# Patient Record
Sex: FEMALE | Race: ASIAN | Employment: OTHER | URBAN - METROPOLITAN AREA
[De-identification: names, ages, dates, MRNs, and addresses within clinical notes are randomized per-mention and may not be internally consistent; named-entity substitution may affect disease eponyms.]

---

## 2017-08-02 ENCOUNTER — APPOINTMENT (OUTPATIENT)
Dept: GENERAL RADIOLOGY | Facility: HOSPITAL | Age: 72
End: 2017-08-02
Attending: EMERGENCY MEDICINE
Payer: MEDICAID

## 2017-08-02 ENCOUNTER — HOSPITAL ENCOUNTER (EMERGENCY)
Facility: HOSPITAL | Age: 72
Discharge: ANOTHER HOSPITAL | End: 2017-08-03
Attending: EMERGENCY MEDICINE
Payer: MEDICAID

## 2017-08-02 DIAGNOSIS — R16.0 LIVER MASS: ICD-10-CM

## 2017-08-02 DIAGNOSIS — R91.8 LUNG MASS: Primary | ICD-10-CM

## 2017-08-02 DIAGNOSIS — E87.1 HYPONATREMIA: ICD-10-CM

## 2017-08-02 LAB
ANION GAP SERPL CALC-SCNC: 11 MMOL/L (ref 0–18)
BASOPHILS # BLD: 0.1 K/UL (ref 0–0.2)
BASOPHILS NFR BLD: 1 %
BILIRUB UR QL: NEGATIVE
BUN SERPL-MCNC: 10 MG/DL (ref 8–20)
BUN/CREAT SERPL: 11.9 (ref 10–20)
CALCIUM SERPL-MCNC: 10.8 MG/DL (ref 8.5–10.5)
CHLORIDE SERPL-SCNC: 92 MMOL/L (ref 95–110)
CLARITY UR: CLEAR
CO2 SERPL-SCNC: 21 MMOL/L (ref 22–32)
COLOR UR: YELLOW
CREAT SERPL-MCNC: 0.84 MG/DL (ref 0.5–1.5)
EOSINOPHIL # BLD: 0.1 K/UL (ref 0–0.7)
EOSINOPHIL NFR BLD: 1 %
ERYTHROCYTE [DISTWIDTH] IN BLOOD BY AUTOMATED COUNT: 15.5 % (ref 11–15)
GLUCOSE BLDC GLUCOMTR-MCNC: 264 MG/DL (ref 70–99)
GLUCOSE SERPL-MCNC: 270 MG/DL (ref 70–99)
HCT VFR BLD AUTO: 31.7 % (ref 35–48)
HGB BLD-MCNC: 10.2 G/DL (ref 12–16)
HGB UR QL STRIP.AUTO: NEGATIVE
KETONES UR-MCNC: NEGATIVE MG/DL
LEUKOCYTE ESTERASE UR QL STRIP.AUTO: NEGATIVE
LYMPHOCYTES # BLD: 1.4 K/UL (ref 1–4)
LYMPHOCYTES NFR BLD: 11 %
MCH RBC QN AUTO: 25 PG (ref 27–32)
MCHC RBC AUTO-ENTMCNC: 32.4 G/DL (ref 32–37)
MCV RBC AUTO: 77.1 FL (ref 80–100)
MONOCYTES # BLD: 0.8 K/UL (ref 0–1)
MONOCYTES NFR BLD: 7 %
NEUTROPHILS # BLD AUTO: 9.6 K/UL (ref 1.8–7.7)
NEUTROPHILS NFR BLD: 80 %
NITRITE UR QL STRIP.AUTO: NEGATIVE
OSMOLALITY UR CALC.SUM OF ELEC: 267 MOSM/KG (ref 275–295)
PH UR: 6 [PH] (ref 5–8)
PLATELET # BLD AUTO: 288 K/UL (ref 140–400)
PMV BLD AUTO: 8.8 FL (ref 7.4–10.3)
POTASSIUM SERPL-SCNC: 4.9 MMOL/L (ref 3.3–5.1)
PROT UR-MCNC: NEGATIVE MG/DL
RBC # BLD AUTO: 4.11 M/UL (ref 3.7–5.4)
SODIUM SERPL-SCNC: 124 MMOL/L (ref 136–144)
SP GR UR STRIP: 1 (ref 1–1.03)
UROBILINOGEN UR STRIP-ACNC: <2
VIT C UR-MCNC: NEGATIVE MG/DL
WBC # BLD AUTO: 12 K/UL (ref 4–11)

## 2017-08-02 PROCEDURE — 96361 HYDRATE IV INFUSION ADD-ON: CPT

## 2017-08-02 PROCEDURE — 81003 URINALYSIS AUTO W/O SCOPE: CPT | Performed by: EMERGENCY MEDICINE

## 2017-08-02 PROCEDURE — 85025 COMPLETE CBC W/AUTO DIFF WBC: CPT | Performed by: EMERGENCY MEDICINE

## 2017-08-02 PROCEDURE — 71010 XR CHEST AP PORTABLE  (CPT=71010): CPT | Performed by: EMERGENCY MEDICINE

## 2017-08-02 PROCEDURE — 93010 ELECTROCARDIOGRAM REPORT: CPT | Performed by: EMERGENCY MEDICINE

## 2017-08-02 PROCEDURE — 99285 EMERGENCY DEPT VISIT HI MDM: CPT

## 2017-08-02 PROCEDURE — 80048 BASIC METABOLIC PNL TOTAL CA: CPT | Performed by: EMERGENCY MEDICINE

## 2017-08-02 PROCEDURE — 93005 ELECTROCARDIOGRAM TRACING: CPT

## 2017-08-02 PROCEDURE — 96360 HYDRATION IV INFUSION INIT: CPT

## 2017-08-02 PROCEDURE — 84484 ASSAY OF TROPONIN QUANT: CPT | Performed by: EMERGENCY MEDICINE

## 2017-08-02 PROCEDURE — 80076 HEPATIC FUNCTION PANEL: CPT | Performed by: EMERGENCY MEDICINE

## 2017-08-02 PROCEDURE — 82962 GLUCOSE BLOOD TEST: CPT

## 2017-08-02 PROCEDURE — 83690 ASSAY OF LIPASE: CPT | Performed by: EMERGENCY MEDICINE

## 2017-08-03 ENCOUNTER — APPOINTMENT (OUTPATIENT)
Dept: CT IMAGING | Facility: HOSPITAL | Age: 72
End: 2017-08-03
Attending: EMERGENCY MEDICINE
Payer: MEDICAID

## 2017-08-03 VITALS
SYSTOLIC BLOOD PRESSURE: 136 MMHG | HEIGHT: 65 IN | RESPIRATION RATE: 17 BRPM | WEIGHT: 158 LBS | BODY MASS INDEX: 26.33 KG/M2 | TEMPERATURE: 98 F | OXYGEN SATURATION: 95 % | DIASTOLIC BLOOD PRESSURE: 61 MMHG | HEART RATE: 90 BPM

## 2017-08-03 LAB
ALBUMIN SERPL BCP-MCNC: 3.1 G/DL (ref 3.5–4.8)
ALP SERPL-CCNC: 81 U/L (ref 32–100)
ALT SERPL-CCNC: 22 U/L (ref 14–54)
AST SERPL-CCNC: 25 U/L (ref 15–41)
BILIRUB DIRECT SERPL-MCNC: 0.1 MG/DL (ref 0–0.2)
BILIRUB SERPL-MCNC: 0.4 MG/DL (ref 0.3–1.2)
LIPASE SERPL-CCNC: 34 U/L (ref 22–51)
PROT SERPL-MCNC: 7.6 G/DL (ref 5.9–8.4)
TROPONIN I SERPL-MCNC: 0.01 NG/ML (ref ?–0.03)

## 2017-08-03 PROCEDURE — 71260 CT THORAX DX C+: CPT | Performed by: EMERGENCY MEDICINE

## 2017-08-03 RX ORDER — ATORVASTATIN CALCIUM 20 MG/1
20 TABLET, FILM COATED ORAL DAILY
COMMUNITY

## 2017-08-03 NOTE — ED NOTES
Patient's family does not want CT. Discussed with family that [facility] requires these results to accept transfer, family still refusing. MD notified.

## 2017-08-03 NOTE — ED NOTES
Pt presents to ED via [facility] [address] from home with c/o fatigue and decreased appetite for 2 months. Pt went to [place] and got back last week- states was there for 3 weeks and was only \"drinking fluids. \" Pt has not been eating a lot- son states the pt will eat if pal

## 2017-08-03 NOTE — ED INITIAL ASSESSMENT (HPI)
Pt c/o high blood sugar, decrease in appetite, fatigue. Denies abd pain, n/v, cp, sob, or fevers. Denies confusion. Per Son reports, \"It all started when she was fasting with Ramadhan\".

## 2017-08-03 NOTE — ED PROVIDER NOTES
Patient Seen in: [facility] AND [facility] Emergency Department    History   Patient presents with:  Fatigue (constitutional, neurologic)    Stated Complaint: Not eating anything and sleeping all the time    HPI    67year old female with pmh DM, HTN who is brou 13  Temp: 99 °F (37.2 °C)  Temp src: Oral  SpO2: 96 %  O2 Device: None (Room air)    Current:/61   Pulse 90   Temp 98.3 °F (36.8 °C) (Oral)   Resp 17   Ht 165.1 cm (5' 5\")   Wt 71.7 kg   SpO2 95%   BMI 26.29 kg/m²         Physical Exam   Constitutio Notable for the following:      Albumin 3.1 (*)     All other components within normal limits   POCT GLUCOSE - Abnormal; Notable for the following:     POC Glucose  264 (*)     All other components within normal limits   CBC W/ DIFFERENTIAL - Abnormal; Nota electronically signed and verified by the Radiologist whose name is printed above.     DD:  08/02/2017/DT:  08/03/2017    CT chest pulmonary angiogram      IMPRESSION:  -Numerous bilateral pulmonary nodules and masses, and left-sided pleural-based nodules m collecting and interpreting tests and discussion with consultants but not including time spent performing procedures.       Disposition and Plan     Clinical Impression:  Lung mass  (primary encounter diagnosis)  Liver mass  Hyponatremia    Disposition:  Tr

## 2018-04-30 ENCOUNTER — HOSPITAL (OUTPATIENT)
Dept: OTHER | Age: 73
End: 2018-04-30
Attending: HOSPITALIST

## 2018-04-30 LAB
ALBUMIN SERPL-MCNC: 1.5 GM/DL (ref 3.6–5.1)
ALBUMIN/GLOB SERPL: 0.2 {RATIO} (ref 1–2.4)
ALP SERPL-CCNC: 156 UNIT/L (ref 45–117)
ALT SERPL-CCNC: 27 UNIT/L
ANALYZER ANC (IANC): ABNORMAL
ANION GAP SERPL CALC-SCNC: 14 MMOL/L (ref 10–20)
APTT PPP: 36 SECONDS (ref 22–30)
APTT PPP: ABNORMAL S
AST SERPL-CCNC: 65 UNIT/L
BASE DEFICIT BLDA-SCNC: 0 MMOL/L (ref 0–2)
BASE EXCESS BLDA CALC-SCNC: ABNORMAL MMOL/L
BASOPHILS # BLD: 0 THOUSAND/MCL (ref 0–0.3)
BASOPHILS NFR BLD: 0 %
BDY SITE: ABNORMAL
BILIRUB SERPL-MCNC: 0.5 MG/DL (ref 0.2–1)
BODY TEMPERATURE: 37 DEGREES
BUN SERPL-MCNC: 14 MG/DL (ref 6–20)
BUN/CREAT SERPL: 17 (ref 7–25)
CA-I BLD ISE-SCNC: 1.21 MMOL/L (ref 1.15–1.29)
CA-I BLDA-SCNC: 13 % (ref 15–23)
CALCIUM SERPL-MCNC: 8.6 MG/DL (ref 8.4–10.2)
CHLORIDE BLD-SCNC: 97 MMOL/L (ref 98–107)
CHLORIDE: 95 MMOL/L (ref 98–107)
CO2 SERPL-SCNC: 26 MMOL/L (ref 21–32)
COHGB MFR BLD: 1.5 %
CONDITION: ABNORMAL
CONDITION: ABNORMAL
CREAT SERPL-MCNC: 0.83 MG/DL (ref 0.51–0.95)
D DIMER PPP FEU-MCNC: 4.26 MG/L FEU
DIFFERENTIAL METHOD BLD: ABNORMAL
EOSINOPHIL # BLD: 0.2 THOUSAND/MCL (ref 0.1–0.5)
EOSINOPHIL NFR BLD: 1 %
ERYTHROCYTE [DISTWIDTH] IN BLOOD: 19.3 % (ref 11–15)
GLOBULIN SER-MCNC: 6.1 GM/DL (ref 2–4)
GLUCOSE BLD-MCNC: 265 MG/DL (ref 65–99)
GLUCOSE SERPL-MCNC: 259 MG/DL (ref 65–99)
HCO3 BLDA-SCNC: 26 MMOL/L (ref 22–28)
HCT VFR BLD CALC: 28 % (ref 36–46.5)
HEMATOCRIT: 27.1 % (ref 36–46.5)
HGB BLD-MCNC: 8.7 GM/DL (ref 12–15.5)
HGB BLD-MCNC: 9.3 GM/DL (ref 12–15.5)
HOROWITZ INDEX BLD+IHG-RTO: 100 %
HYPOCHROMIA (HYPOC): ABNORMAL
INR PPP: 1.2
LACTATE BLDA-MCNC: 2.3 MMOL/L
LYMPHOCYTES # BLD: 6.4 THOUSAND/MCL (ref 1–4)
LYMPHOCYTES NFR BLD: 28 %
MCH RBC QN AUTO: 23.6 PG (ref 26–34)
MCHC RBC AUTO-ENTMCNC: 32.1 GM/DL (ref 32–36.5)
MCV RBC AUTO: 73.6 FL (ref 78–100)
METHGB MFR BLD: 1 %
MONOCYTES # BLD: 1.4 THOUSAND/MCL (ref 0.3–0.9)
MONOCYTES NFR BLD: 6 %
MYELOCYTES NFR BLD: 1 %
NEUTROPHILS # BLD: 14.6 THOUSAND/MCL (ref 1.8–7.7)
NEUTS SEG NFR BLD: 64 %
OXYHGB MFR BLD: 96 % (ref 94–98)
PATH REV BLD -IMP: ABNORMAL
PATHOLOGIST NAME: NORMAL
PCO2 BLDA: 52 MM HG (ref 32–45)
PERCENT NRBC: 0 %
PH BLDA: 7.31 UNIT (ref 7.35–7.45)
PLAT MORPH BLD: NORMAL
PLATELET # BLD: 770 THOUSAND/MCL (ref 140–450)
PO2 BLDA: 100 MM HG (ref 83–108)
POLYCHROMASIA (POLY): ABNORMAL
POTASSIUM BLD-SCNC: 3.7 MMOL/L (ref 3.4–5.1)
POTASSIUM SERPL-SCNC: 4 MMOL/L (ref 3.4–5.1)
PROT SERPL-MCNC: 7.6 GM/DL (ref 6.4–8.2)
PROTHROMBIN TIME: 11.6 SECONDS (ref 9.7–11.8)
PROTHROMBIN TIME: NORMAL
RBC # BLD: 3.68 MILLION/MCL (ref 4–5.2)
SAO2 % BLDA: 99 % (ref 95–99)
SHISTOCYTES (SHSTO): ABNORMAL
SMEAR/PATHOLOGY EVALUATION: NORMAL
SODIUM BLD-SCNC: 128 MMOL/L (ref 135–145)
SODIUM SERPL-SCNC: 131 MMOL/L (ref 135–145)
TROPONIN I SERPL HS-MCNC: 0.15 NG/ML
WBC # BLD: 22.8 THOUSAND/MCL (ref 4.2–11)

## 2018-05-01 ENCOUNTER — DIAGNOSTIC TRANS (OUTPATIENT)
Dept: OTHER | Age: 73
End: 2018-05-01

## 2018-05-01 LAB
ANION GAP SERPL CALC-SCNC: 12 MMOL/L (ref 10–20)
BUN SERPL-MCNC: 15 MG/DL (ref 6–20)
BUN/CREAT SERPL: 20 (ref 7–25)
CALCIUM SERPL-MCNC: 8.2 MG/DL (ref 8.4–10.2)
CHLORIDE: 97 MMOL/L (ref 98–107)
CO2 SERPL-SCNC: 27 MMOL/L (ref 21–32)
CREAT SERPL-MCNC: 0.76 MG/DL (ref 0.51–0.95)
GLUCOSE BLDC GLUCOMTR-MCNC: 226 MG/DL (ref 65–99)
GLUCOSE SERPL-MCNC: 185 MG/DL (ref 65–99)
LACTATE BLDV-SCNC: 1.2 MMOL/L (ref 0–2)
POTASSIUM SERPL-SCNC: 3.8 MMOL/L (ref 3.4–5.1)
SODIUM SERPL-SCNC: 132 MMOL/L (ref 135–145)

## 2018-05-02 ENCOUNTER — HOSPITAL (OUTPATIENT)
Dept: OTHER | Age: 73
End: 2018-05-02
Attending: HOSPITALIST